# Patient Record
Sex: MALE
[De-identification: names, ages, dates, MRNs, and addresses within clinical notes are randomized per-mention and may not be internally consistent; named-entity substitution may affect disease eponyms.]

---

## 2022-07-27 ENCOUNTER — APPOINTMENT (OUTPATIENT)
Dept: ORTHOPEDIC SURGERY | Facility: CLINIC | Age: 27
End: 2022-07-27

## 2022-07-27 VITALS — BODY MASS INDEX: 28.04 KG/M2 | WEIGHT: 207 LBS | HEIGHT: 72 IN

## 2022-07-27 DIAGNOSIS — Z78.9 OTHER SPECIFIED HEALTH STATUS: ICD-10-CM

## 2022-07-27 DIAGNOSIS — M25.531 PAIN IN RIGHT WRIST: ICD-10-CM

## 2022-07-27 PROBLEM — Z00.00 ENCOUNTER FOR PREVENTIVE HEALTH EXAMINATION: Status: ACTIVE | Noted: 2022-07-27

## 2022-07-27 PROCEDURE — 73110 X-RAY EXAM OF WRIST: CPT | Mod: RT

## 2022-07-27 PROCEDURE — 99203 OFFICE O/P NEW LOW 30 MIN: CPT

## 2022-07-27 NOTE — DISCUSSION/SUMMARY
[de-identified] : This time impression is right wrist pain possibly due to a ganglion cyst.\par Explained to the patient that this can releases a appears to be either small or deep, this is no grossly palpated.\par But with presentation and a fluid collection on the dorsal aspect of the wrist my impression is ganglion cyst.\par To confirm that I would like to get an MRI of the right wrist to evaluate possible surgical intervention.\par Patient was advised to give me a call after the MRI is done so we can discuss results over the phone.\par Patient was advised to do activities as tolerated.\par Patient was advised to follow up with our hand surgeon in about 4 weeks.\par \par Supervising physician Dr. Feldman.\par

## 2022-07-27 NOTE — HISTORY OF PRESENT ILLNESS
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

## 2022-07-27 NOTE — PHYSICAL EXAM
[Dorsal Wrist] : dorsal wrist [NL (75)] : Dorsiflexion 75 degrees [NL (85)] : volarflexion 85 degrees [NL (25)] : radial deviation 25 degrees [NL (40)] : ulnar deviation 40 degrees [NL (90)] : supination 90 degrees [5___] : grasp 5[unfilled]/5 [] : no instability at DRUJ [FreeTextEntry3] : Minimal swelling over the dorsal aspect of the wrist when compared to his left, this is most noticeable with dorsiflexion of the wrist. [de-identified] : small swelling/ cystic lesion over the dorsal aspect of the wrist with dorsiflexion of the wrist. [FreeTextEntry9] : Pain with dorsiflexion of the wrist.

## 2022-08-30 ENCOUNTER — APPOINTMENT (OUTPATIENT)
Dept: ORTHOPEDIC SURGERY | Facility: CLINIC | Age: 27
End: 2022-08-30

## 2022-09-26 ENCOUNTER — APPOINTMENT (OUTPATIENT)
Dept: GASTROENTEROLOGY | Facility: CLINIC | Age: 27
End: 2022-09-26

## 2022-11-22 ENCOUNTER — APPOINTMENT (OUTPATIENT)
Dept: GASTROENTEROLOGY | Facility: CLINIC | Age: 27
End: 2022-11-22

## 2022-11-22 VITALS — WEIGHT: 210 LBS | BODY MASS INDEX: 28.44 KG/M2 | HEIGHT: 72 IN

## 2022-11-22 DIAGNOSIS — R14.0 ABDOMINAL DISTENSION (GASEOUS): ICD-10-CM

## 2022-11-22 DIAGNOSIS — K62.5 HEMORRHAGE OF ANUS AND RECTUM: ICD-10-CM

## 2022-11-22 DIAGNOSIS — Z78.9 OTHER SPECIFIED HEALTH STATUS: ICD-10-CM

## 2022-11-22 DIAGNOSIS — R14.3 FLATULENCE: ICD-10-CM

## 2022-11-22 DIAGNOSIS — Z80.9 FAMILY HISTORY OF MALIGNANT NEOPLASM, UNSPECIFIED: ICD-10-CM

## 2022-11-22 PROCEDURE — 99203 OFFICE O/P NEW LOW 30 MIN: CPT

## 2022-11-22 RX ORDER — SODIUM SULFATE, POTASSIUM SULFATE AND MAGNESIUM SULFATE 1.6; 3.13; 17.5 G/177ML; G/177ML; G/177ML
17.5-3.13-1.6 SOLUTION ORAL
Qty: 2 | Refills: 0 | Status: ACTIVE | COMMUNITY
Start: 2022-11-22 | End: 1900-01-01

## 2022-11-22 NOTE — HISTORY OF PRESENT ILLNESS
[FreeTextEntry1] : 27 yr old male here for further evaluation of bloating, flatulence, and BRBPR noted on toilet tissue only and started about a month ago. He last noted the BRBPR about 2 weeks ago.  He reported constipation prior to the onset of his symptoms and he had been straining a lot before he noticed the blood. He took MOM with good response and he reported normal BMs recently. His last BM was 2 days ago and was normal. He reported heartburn only when eating before bedtime. He denied vomiting, dysphagia, abdominal pain, fevers, weight loss, jaundice, melena, or hematemesis. He denied a Family H/O colon, gastric, liver, esophageal, or pancreatic cancer.

## 2022-11-22 NOTE — PHYSICAL EXAM
[Sclera] : the sclera and conjunctiva were normal [None] : no edema [Normal] : normal bowel sounds, non-tender, no masses, soft, no no hepato-splenomegaly [No CVA Tenderness] : no CVA  tenderness [Abnormal Walk] : normal gait [Normal Color / Pigmentation] : normal skin color and pigmentation [Oriented To Time, Place, And Person] : oriented to person, place, and time

## 2022-11-22 NOTE — REVIEW OF SYSTEMS
[As Noted in HPI] : as noted in HPI [Constipation] : constipation [Bleeding] : bleeding [Negative] : Heme/Lymph [Bloating (gassiness)] : bloating [Abdominal Pain] : no abdominal pain [Vomiting] : no vomiting [Diarrhea] : no diarrhea [Heartburn] : no heartburn [Melena (black stool)] : no melena [Fecal Incontinence (soiling)] : no fecal incontinence

## 2022-11-22 NOTE — REASON FOR VISIT
[Initial Evaluation] : an initial evaluation [FreeTextEntry1] : NPA - BLOATING, rectal bleeding, constipation last month

## 2023-01-25 ENCOUNTER — NON-APPOINTMENT (OUTPATIENT)
Age: 28
End: 2023-01-25

## 2023-05-09 ENCOUNTER — APPOINTMENT (OUTPATIENT)
Dept: GASTROENTEROLOGY | Facility: CLINIC | Age: 28
End: 2023-05-09